# Patient Record
Sex: FEMALE | Race: BLACK OR AFRICAN AMERICAN | Employment: UNEMPLOYED | URBAN - METROPOLITAN AREA
[De-identification: names, ages, dates, MRNs, and addresses within clinical notes are randomized per-mention and may not be internally consistent; named-entity substitution may affect disease eponyms.]

---
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
## 2023-01-10 ENCOUNTER — HOSPITAL ENCOUNTER (EMERGENCY)
Age: 18
Discharge: HOME | End: 2023-01-10
Attending: EMERGENCY MEDICINE
Payer: MEDICAID

## 2023-01-10 ENCOUNTER — APPOINTMENT (OUTPATIENT)
Dept: GENERAL RADIOLOGY | Age: 18
End: 2023-01-10
Attending: EMERGENCY MEDICINE
Payer: MEDICAID

## 2023-01-10 VITALS
SYSTOLIC BLOOD PRESSURE: 119 MMHG | DIASTOLIC BLOOD PRESSURE: 73 MMHG | OXYGEN SATURATION: 100 % | RESPIRATION RATE: 16 BRPM | TEMPERATURE: 98.7 F | HEART RATE: 73 BPM

## 2023-01-10 DIAGNOSIS — S69.91XA RIGHT WRIST INJURY, INITIAL ENCOUNTER: ICD-10-CM

## 2023-01-10 DIAGNOSIS — S69.91XA HAND INJURY, RIGHT, INITIAL ENCOUNTER: Primary | ICD-10-CM

## 2023-01-10 PROCEDURE — 73110 X-RAY EXAM OF WRIST: CPT

## 2023-01-10 PROCEDURE — 99283 EMERGENCY DEPT VISIT LOW MDM: CPT

## 2023-01-10 PROCEDURE — 73130 X-RAY EXAM OF HAND: CPT

## 2023-01-10 RX ORDER — NAPROXEN 500 MG/1
500 TABLET ORAL 2 TIMES DAILY WITH MEALS
Qty: 20 TABLET | Refills: 0 | Status: SHIPPED | OUTPATIENT
Start: 2023-01-10 | End: 2023-01-20

## 2023-01-10 NOTE — Clinical Note
[address] [place] [address] Dr [facility] EMERGENCY DEPT  [address] [address] [phone]    Work/School Note    Date: 1/10/2023    To Whom It May concern:      Demetris Diego was seen and treated today in the emergency room by the following provider(s):  Attending Provider: Ulises Feliciano MD  Physician Assistant: ANDREA Jane. Demetris Diego is excused from work/school on 01/10/23. She is clear to return to work/school on 01/11/23.         Sincerely,          Sunday ANDREA Ramirez
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
## 2023-01-10 NOTE — Clinical Note
[address] [place] [address] Dr [facility] EMERGENCY DEPT  [address] [address] [phone]    Work/School Note    Date: 1/10/2023    To Whom It May concern:      José Miguel Vargas was seen and treated today in the emergency room by the following provider(s):  Attending Provider: Zackary Sanchez MD  Physician Assistant: ANDREA Delgado. José Miguel Vargas is excused from work/school on 01/10/23. She is clear to return to work/school on 01/11/23.         Sincerely,          ANDREA Aceves
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
## 2023-01-18 ENCOUNTER — HOSPITAL ENCOUNTER (EMERGENCY)
Age: 18
Discharge: HOME | End: 2023-01-18
Attending: EMERGENCY MEDICINE
Payer: MEDICAID

## 2023-01-18 VITALS
SYSTOLIC BLOOD PRESSURE: 122 MMHG | RESPIRATION RATE: 16 BRPM | OXYGEN SATURATION: 99 % | HEART RATE: 93 BPM | DIASTOLIC BLOOD PRESSURE: 88 MMHG | TEMPERATURE: 98.7 F

## 2023-01-18 DIAGNOSIS — L02.91 ABSCESS: Primary | ICD-10-CM

## 2023-01-18 PROCEDURE — 99283 EMERGENCY DEPT VISIT LOW MDM: CPT

## 2023-01-18 RX ORDER — CEPHALEXIN 500 MG/1
500 CAPSULE ORAL 4 TIMES DAILY
Qty: 28 CAPSULE | Refills: 0 | Status: SHIPPED | OUTPATIENT
Start: 2023-01-18 | End: 2023-01-25

## 2023-01-18 NOTE — Clinical Note
[address] [place] [address] Dr [facility] EMERGENCY DEPT  [address] [facility] [address] [phone]    Work/School Note    Date: 1/18/2023    To Whom It May concern:      Gita Bradley was seen and treated today in the emergency room by the following provider(s):  Attending Provider: Shant Caldera MD  Physician Assistant: ANDREA Barrera. Gita Bradley is excused from work/school on 01/18/23. She is clear to return to work/school on 01/19/23.         Sincerely,          ANDREA Kat

## 2023-01-18 NOTE — Clinical Note
[facility]  [facility] EMERGENCY DEPT  [address] [id number] [phone]    Work/School Note    Date: 1/18/2023    To Whom It May concern:      Robert Cruz was seen and treated today in the emergency room by the following provider(s):  Attending Provider: Juan M Alston MD  Physician Assistant: ANDREA Tai. Robert Cruz is excused from work/school on 01/18/23. She is clear to return to work/school on 01/19/23.         Sincerely,          ANDREA Santiago

## 2023-01-18 NOTE — ED PROVIDER NOTES
EMERGENCY DEPARTMENT HISTORY AND PHYSICAL EXAM    Date: 1/18/2023  Patient Name: Nico Freedman    History of Presenting Illness     Chief Complaint   Patient presents with    Hair/Scalp Problem         History Provided By: Patient      Additional History (Context): Nico Freedman is a 21 y.o. female with a history of asthma who presents today for a 1 day history of scalp drainage. Patient reports woke up this morning and initially thought she had a headache but then realized that it was a spot on the back of her head that was draining. Reports she has been having issues with hair loss so she recently cut her hair. Denies any changes in soaps or detergents. Has not been seen or evaluated for this before. Denies history of this in the past.      PCP: Other, MD Florence    Current Outpatient Medications   Medication Sig Dispense Refill    cephALEXin (Keflex) 500 mg capsule Take 1 Capsule by mouth four (4) times daily for 7 days. 28 Capsule 0    naproxen (Naprosyn) 500 mg tablet Take 1 Tablet by mouth two (2) times daily (with meals) for 10 days. 20 Tablet 0    ondansetron hcl (Zofran) 4 mg tablet Take 1 Tablet by mouth every eight (8) hours as needed for Nausea. 12 Tablet 0       Past History     Past Medical History:  Past Medical History:   Diagnosis Date    Asthma        Past Surgical History:  No past surgical history on file. Family History:  Family History   Problem Relation Age of Onset    Obesity Mother        Social History: Allergies: Allergies   Allergen Reactions    Peanut Anaphylaxis         Review of Systems   Review of Systems   Constitutional:  Negative for chills and fever. HENT:  Negative for congestion, rhinorrhea and sore throat. Respiratory:  Negative for cough and shortness of breath. Cardiovascular:  Negative for chest pain. Gastrointestinal:  Negative for abdominal pain, blood in stool, constipation, diarrhea, nausea and vomiting.    Genitourinary:  Negative for dysuria, frequency and hematuria. Musculoskeletal:  Negative for back pain and myalgias. Skin:  Positive for rash. Negative for wound. Neurological:  Negative for dizziness and headaches. All other systems reviewed and are negative. All Other Systems Negative  Physical Exam     Vitals:    01/18/23 1735   BP: 122/88   Pulse: 93   Resp: 16   Temp: 98.7 °F (37.1 °C)   SpO2: 99%     Physical Exam  Vitals and nursing note reviewed. Constitutional:       General: She is not in acute distress. Appearance: She is well-developed. She is not diaphoretic. HENT:      Head: Normocephalic and atraumatic. Eyes:      Conjunctiva/sclera: Conjunctivae normal.   Cardiovascular:      Rate and Rhythm: Normal rate and regular rhythm. Heart sounds: Normal heart sounds. Pulmonary:      Effort: Pulmonary effort is normal. No respiratory distress. Breath sounds: Normal breath sounds. Chest:      Chest wall: No tenderness. Abdominal:      General: Bowel sounds are normal. There is no distension. Palpations: Abdomen is soft. Tenderness: There is no abdominal tenderness. There is no guarding or rebound. Musculoskeletal:         General: No deformity. Cervical back: Normal range of motion and neck supple. Skin:     General: Skin is warm and dry. Findings: Abscess present. Comments: Abscess with active purulent drainage noted to the top of the scalp, TTP   Neurological:      Mental Status: She is alert and oriented to person, place, and time. Deep Tendon Reflexes: Reflexes are normal and symmetric. Diagnostic Study Results     Labs -   No results found for this or any previous visit (from the past 12 hour(s)). Radiologic Studies -   No orders to display     CT Results  (Last 48 hours)      None          CXR Results  (Last 48 hours)      None              Medical Decision Making   I am the first provider for this patient.     I reviewed the vital signs, available nursing notes, past medical history, past surgical history, family history and social history. Vital Signs-Reviewed the patient's vital signs. Records Reviewed: Nursing Notes and Old Medical Records     Procedures: None   Procedures    Provider Notes (Medical Decision Making):     differential diagnosis: Alopecia, abscess, cellulitis, folliculitis    Plan: History and physical exam most consistent with abscess. Abscess is already draining appropriately. Have encouraged proper at home wound care with the patient, hot compresses and antibiotic compliance. Advised patient return in 2 days as needed for wound check. Will discharge home. MED RECONCILIATION:  No current facility-administered medications for this encounter. Current Outpatient Medications   Medication Sig    cephALEXin (Keflex) 500 mg capsule Take 1 Capsule by mouth four (4) times daily for 7 days. naproxen (Naprosyn) 500 mg tablet Take 1 Tablet by mouth two (2) times daily (with meals) for 10 days. ondansetron hcl (Zofran) 4 mg tablet Take 1 Tablet by mouth every eight (8) hours as needed for Nausea. Disposition:  Home     DISCHARGE NOTE:   Pt has been reexamined. Patient has no new complaints, changes, or physical findings. Care plan outlined and precautions discussed. Results of workup were reviewed with the patient. All medications were reviewed with the patient. All of pt's questions and concerns were addressed. Patient was instructed and agrees to follow up with PCP/Derm as well as to return to the ED upon further deterioration. Patient is ready to go home.     Follow-up Information       Follow up With Specialties Details Why Contact Info    [facility] EMERGENCY DEPT Emergency Medicine  As needed [address] [place] [address]  [phone]    [facility]  Schedule an appointment as soon as possible for a visit   [address]  [facility]  [phone]            Current Discharge Medication List        START taking these medications    Details   cephALEXin (Keflex) 500 mg capsule Take 1 Capsule by mouth four (4) times daily for 7 days. Qty: 28 Capsule, Refills: 0  Start date: 1/18/2023, End date: 1/25/2023                 Diagnosis     Clinical Impression:   1. Abscess          \"Please note that this dictation was completed with [employer], the computer voice recognition software. Quite often unanticipated grammatical, syntax, homophones, and other interpretive errors are inadvertently transcribed by the computer software. Please disregard these errors. Please excuse any errors that have escaped final proofreading. \"

## 2023-01-18 NOTE — ED TRIAGE NOTES
Pt arrived with c/o of headache, \"scalp leaking\" and hair loss. Pt states she woke up and her head was hurting, went back to sleep. When she woke up again pt states \"my head was still hurting but it wasn't like a regular headache. \" Pt has a white bump on her scalp and also that her hair has been falling out since the beginning of January. Denies and new hair products.

## 2023-02-25 ENCOUNTER — HOSPITAL ENCOUNTER (EMERGENCY)
Facility: HOSPITAL | Age: 18
Discharge: HOME | End: 2023-02-25
Attending: EMERGENCY MEDICINE
Payer: MEDICAID

## 2023-02-25 ENCOUNTER — APPOINTMENT (OUTPATIENT)
Facility: HOSPITAL | Age: 18
End: 2023-02-25
Payer: MEDICAID

## 2023-02-25 VITALS
SYSTOLIC BLOOD PRESSURE: 112 MMHG | RESPIRATION RATE: 20 BRPM | TEMPERATURE: 98.3 F | DIASTOLIC BLOOD PRESSURE: 80 MMHG | HEART RATE: 85 BPM | OXYGEN SATURATION: 100 %

## 2023-02-25 DIAGNOSIS — M72.2 PLANTAR FASCIITIS: Primary | ICD-10-CM

## 2023-02-25 PROCEDURE — 73630 X-RAY EXAM OF FOOT: CPT

## 2023-02-25 PROCEDURE — 73610 X-RAY EXAM OF ANKLE: CPT

## 2023-02-25 PROCEDURE — 99283 EMERGENCY DEPT VISIT LOW MDM: CPT

## 2023-02-25 RX ORDER — NAPROXEN 500 MG/1
500 TABLET ORAL 2 TIMES DAILY
Qty: 60 TABLET | Refills: 0 | Status: SHIPPED | OUTPATIENT
Start: 2023-02-25

## 2023-02-25 ASSESSMENT — ENCOUNTER SYMPTOMS: COLOR CHANGE: 0

## 2023-02-25 NOTE — ED NOTES
All discharge paperwork reviewed with patient and she denies any need for further explanation regarding these instructions. Orthotic boot placed on right foot and patient educated on proper use.  Denies need for wheelchair and ambulates out of ED     Fabian Dowell, [address] [place] [address]  02/25/23 [id number]

## 2023-02-25 NOTE — ED PROVIDER NOTES
[facility] EMERGENCY DEPT  EMERGENCY DEPARTMENT ENCOUNTER      Pt Name: Jessica Dawson  MRN: [id number]  [place] 2005  Date of evaluation: 2/25/2023  Provider: Markus Frank Dr       Chief Complaint   Patient presents with    Foot Pain     Rt foot           HISTORY OF PRESENT ILLNESS   (Location/Symptom, Timing/Onset, Context/Setting, Quality, Duration, Modifying Factors, Severity)  Note limiting factors. Jessica Dawson is a 21 y.o. female who presents to the emergency department complaint of right foot pain. Has pain now for several days on the right foot arch. Has worn Dr. Chris Davis inserts previously when she worked at [employer]. Denies trauma. Denies possibility of pregnancy. Denies redness swelling. Pain is worse with weightbearing. HPI    Nursing Notes were reviewed. REVIEW OF SYSTEMS    (2-9 systems for level 4, 10 or more for level 5)     Review of Systems   Musculoskeletal:  Positive for arthralgias and gait problem. Skin:  Negative for color change. Neurological:  Negative for weakness and numbness. Except as noted above the remainder of the review of systems was reviewed and negative. PAST MEDICAL HISTORY     Past Medical History:   Diagnosis Date    Asthma          SURGICAL HISTORY     No past surgical history on file.       CURRENT MEDICATIONS       Previous Medications    No medications on file       ALLERGIES     Peanut (diagnostic)    FAMILY HISTORY       Family History   Problem Relation Age of Onset    Obesity Mother           SOCIAL HISTORY       Social History     Socioeconomic History    Marital status: Single       SCREENINGS         Ryan Coma Scale  Eye Opening: Spontaneous  Best Verbal Response: Oriented  Best Motor Response: Obeys commands  [place] Coma Scale Score: 15                     CIWA Assessment  BP: 112/80  Heart Rate: 85                 PHYSICAL EXAM    (up to 7 for level 4, 8 or more for level 5)     ED Triage Vitals [02/25/23 1458]   BP Temp Temp Source Heart Rate Resp SpO2 Height Weight   112/80 98.3 °F (36.8 °C) Oral 85 20 100 % -- --       Physical Exam  Constitutional:       General: She is not in acute distress. Appearance: Normal appearance. She is normal weight. She is not toxic-appearing. HENT:      Head: Normocephalic. Nose: Nose normal.      Mouth/Throat:      Mouth: Mucous membranes are moist.   Eyes:      Extraocular Movements: Extraocular movements intact. Cardiovascular:      Rate and Rhythm: Normal rate and regular rhythm. Pulses: Normal pulses. Heart sounds: Normal heart sounds. Pulmonary:      Effort: Pulmonary effort is normal.      Breath sounds: Normal breath sounds. Abdominal:      General: Abdomen is flat. Tenderness: There is no abdominal tenderness. Musculoskeletal:         General: Tenderness present. No deformity or signs of injury. Cervical back: Normal range of motion and neck supple. No rigidity. Comments: Right foot mid plantar foot surface tenderness. No obvious deformity. No open wounds. No swelling or redness or warmth. DP PT pulses palpable. Skin:     General: Skin is warm. Neurological:      Mental Status: She is alert and oriented to person, place, and time. Psychiatric:         Mood and Affect: Mood normal.         Behavior: Behavior normal.         Thought Content:  Thought content normal.         Judgment: Judgment normal.       DIAGNOSTIC RESULTS     EKG: All EKG's are interpreted by the Emergency Department Physician who either signs or Co-signs this chart in the absence of a cardiologist.        RADIOLOGY:   Non-plain film images such as CT, Ultrasound and MRI are read by the radiologist. Plain radiographic images are visualized and preliminarily interpreted by the emergency physician with the below findings:    No fractures    Interpretation per the Radiologist below, if available at the time of this note:    XR FOOT RIGHT (MIN 3 VIEWS)    (Results Pending)   XR ANKLE RIGHT (MIN 3 VIEWS)    (Results Pending)         ED BEDSIDE ULTRASOUND:   Performed by ED Physician - none    LABS:  Labs Reviewed - No data to display    All other labs were within normal range or not returned as of this dictation. EMERGENCY DEPARTMENT COURSE and DIFFERENTIAL DIAGNOSIS/MDM:   Vitals:    Vitals:    02/25/23 1458   BP: 112/80   Pulse: 85   Resp: 20   Temp: 98.3 °F (36.8 °C)   TempSrc: Oral   SpO2: 100%           Medical Decision Making  Amount and/or Complexity of Data Reviewed  Radiology: ordered. Risk  Prescription drug management. Treat likely plantar fasciitis with Naprosyn and recommended orthotic inserts. We will give her a postop shoe here and follow-up with podiatry. Postop shoe applied by tech to right foot excellent position; neurovascularly intact before and after application. REASSESSMENT        FINAL IMPRESSION      1. Plantar fasciitis          DISPOSITION/PLAN   DISPOSITION Decision To Discharge 02/25/2023 03:19:24 PM      PATIENT REFERRED TO:  Lisa Reardon [address] [place] [address] [phone]    Schedule an appointment as soon as possible for a visit in 2 days      [address] [place] [address] EMERGENCY DEPT  [address] [place] [address]  [phone]    If symptoms worsen return immediately      DISCHARGE MEDICATIONS:  New Prescriptions    NAPROXEN (NAPROSYN) 500 MG TABLET    Take 1 tablet by mouth 2 times daily     Controlled Substances Monitoring:     No flowsheet data found.     (Please note that portions of this note were completed with a voice recognition program.  Efforts were made to edit the dictations but occasionally words are mis-transcribed.)    SADIA Pérez (electronically signed)  Attending Emergency Physician           Ruben Proctor  02/25/23 6625

## 2023-10-01 ENCOUNTER — HOSPITAL ENCOUNTER (EMERGENCY)
Facility: HOSPITAL | Age: 18
Discharge: HOME | End: 2023-10-01
Attending: EMERGENCY MEDICINE
Payer: MEDICAID

## 2023-10-01 VITALS
OXYGEN SATURATION: 100 % | HEIGHT: 63 IN | HEART RATE: 92 BPM | TEMPERATURE: 99.3 F | BODY MASS INDEX: 24.63 KG/M2 | WEIGHT: 139 LBS | RESPIRATION RATE: 18 BRPM | DIASTOLIC BLOOD PRESSURE: 76 MMHG | SYSTOLIC BLOOD PRESSURE: 117 MMHG

## 2023-10-01 DIAGNOSIS — R07.89 CHEST WALL PAIN: Primary | ICD-10-CM

## 2023-10-01 LAB
FLUAV RNA SPEC QL NAA+PROBE: NOT DETECTED
FLUBV RNA SPEC QL NAA+PROBE: NOT DETECTED
SARS-COV-2 RNA RESP QL NAA+PROBE: NOT DETECTED

## 2023-10-01 PROCEDURE — 99283 EMERGENCY DEPT VISIT LOW MDM: CPT

## 2023-10-01 PROCEDURE — 93005 ELECTROCARDIOGRAM TRACING: CPT | Performed by: EMERGENCY MEDICINE

## 2023-10-01 PROCEDURE — 87636 SARSCOV2 & INF A&B AMP PRB: CPT

## 2023-10-01 PROCEDURE — 6370000000 HC RX 637 (ALT 250 FOR IP): Performed by: EMERGENCY MEDICINE

## 2023-10-01 RX ORDER — ACETAMINOPHEN 325 MG/1
650 TABLET ORAL
Status: COMPLETED | OUTPATIENT
Start: 2023-10-01 | End: 2023-10-01

## 2023-10-01 RX ADMIN — ACETAMINOPHEN 325MG 650 MG: 325 TABLET ORAL at 20:42

## 2023-10-01 ASSESSMENT — ENCOUNTER SYMPTOMS
CHEST TIGHTNESS: 0
COUGH: 1
SHORTNESS OF BREATH: 0

## 2023-10-01 ASSESSMENT — PAIN - FUNCTIONAL ASSESSMENT: PAIN_FUNCTIONAL_ASSESSMENT: 0-10

## 2023-10-01 ASSESSMENT — PAIN DESCRIPTION - LOCATION: LOCATION: CHEST;BACK

## 2023-10-01 ASSESSMENT — PAIN SCALES - GENERAL
PAINLEVEL_OUTOF10: 7
PAINLEVEL_OUTOF10: 6

## 2023-10-01 NOTE — ED TRIAGE NOTES
Pt to ED reports headache, chest and back pain onset 2 days ago worse with coughing pt 21wks gestation pt denies abdominal pain, nausea, vomiting.

## 2023-10-02 LAB
EKG ATRIAL RATE: 94 BPM
EKG DIAGNOSIS: NORMAL
EKG P AXIS: 41 DEGREES
EKG P-R INTERVAL: 158 MS
EKG Q-T INTERVAL: 334 MS
EKG QRS DURATION: 72 MS
EKG QTC CALCULATION (BAZETT): 417 MS
EKG R AXIS: 57 DEGREES
EKG T AXIS: 25 DEGREES
EKG VENTRICULAR RATE: 94 BPM

## 2023-10-02 PROCEDURE — 93010 ELECTROCARDIOGRAM REPORT: CPT | Performed by: INTERNAL MEDICINE

## 2023-10-02 NOTE — ED PROVIDER NOTES
[facility] EMERGENCY DEPT  EMERGENCY DEPARTMENT ENCOUNTER      Pt Name: Steve Layton  MRN: [id number]  [address] [facility] 2005  Date of evaluation: 10/1/2023  Provider: Kinza Vila MD    [address]       Chief Complaint   Patient presents with    Headache     Pt to ED reports headache, chest and back pain onset 2 days ago worse with coughing pt 21wks gestation pt denies abdominal pain, nausea, vomiting. HISTORY OF PRESENT ILLNESS   (Location/Symptom, Timing/Onset, Context/Setting, Quality, Duration, Modifying Factors, Severity)  Note limiting factors. Steve Layton is a 21 y.o. female who presents to the emergency department     21year-old patient 5 months pregnant presents the emergency department because when she coughs or sneeze she has chest pain. Patient has no abdominal pain no trauma reported. Patient has no shortness of breath. No vaginal discharge burning with urination. No history of similar episode. Patient has an October 10 appointment with GYN. She did say that there is something abnormal about the placenta. Patient has no reported history of hypertension. No leg swelling no other issues expressed. Patient only has pain with coughing and sneezing. The history is provided by the patient. No  was used. Nursing Notes were reviewed. REVIEW OF SYSTEMS    (2-9 systems for level 4, 10 or more for level 5)     Review of Systems   Respiratory:  Positive for cough. Negative for chest tightness and shortness of breath. Cardiovascular:  Positive for chest pain. Neurological:  Negative for dizziness and weakness. Except as noted above the remainder of the review of systems was reviewed and negative. PAST MEDICAL HISTORY     Past Medical History:   Diagnosis Date    Asthma          SURGICAL HISTORY     No past surgical history on file.       CURRENT MEDICATIONS       Previous Medications    NAPROXEN (NAPROSYN) 500 MG TABLET    Take 1 tablet

## 2023-10-02 NOTE — ED NOTES
I have reviewed the discharge instructions with the patient. The patient verbalized understanding of instructions with no further questions.       Amari Marrufo RN  10/01/23 2056